# Patient Record
Sex: FEMALE
[De-identification: names, ages, dates, MRNs, and addresses within clinical notes are randomized per-mention and may not be internally consistent; named-entity substitution may affect disease eponyms.]

---

## 2022-09-23 ENCOUNTER — APPOINTMENT (OUTPATIENT)
Dept: AFTER HOURS CARE | Facility: EMERGENCY ROOM | Age: 62
End: 2022-09-23

## 2022-09-24 DIAGNOSIS — U07.1 COVID-19: ICD-10-CM

## 2022-09-24 PROCEDURE — 99204 OFFICE O/P NEW MOD 45 MIN: CPT | Mod: 95

## 2022-09-24 RX ORDER — NIRMATRELVIR AND RITONAVIR 300-100 MG
20 X 150 MG & KIT ORAL
Qty: 30 | Refills: 0 | Status: ACTIVE | COMMUNITY
Start: 2022-09-24 | End: 1900-01-01

## 2022-09-24 NOTE — HISTORY OF PRESENT ILLNESS
[Home] : at home, [unfilled] , at the time of the visit. [Other Location: e.g. Home (Enter Location, City,State)___] : at [unfilled] [Spouse] : spouse [Family Member] : family member [Verbal consent obtained from patient] : the patient, [unfilled] [FreeTextEntry8] : 61 yo f with hx of HTN, Hypothyroidism, depression vaccinated exposed to her  who had covid and now developed symptoms of fever sorethroat . current O2 sat on R A 92-94%\par \par \par \par \par

## 2022-09-24 NOTE — PHYSICAL EXAM
[No Acute Distress] : no acute distress [EOMI] : extraocular movements intact [Normal Outer Ear/Nose] : the outer ears and nose were normal in appearance [Supple] : supple [No Respiratory Distress] : no respiratory distress  [No Accessory Muscle Use] : no accessory muscle use [Normal Insight/Judgement] : insight and judgment were intact [de-identified] : moves upper ext [de-identified] : no rash on the face [de-identified] : alert and orinted with clear speech

## 2022-09-24 NOTE — REVIEW OF SYSTEMS
[Fever] : fever [Chills] : chills [Fatigue] : fatigue [Sore Throat] : sore throat [Discharge] : no discharge [Earache] : no earache [Nasal Discharge] : no nasal discharge [Chest Pain] : no chest pain [Palpitations] : no palpitations [Shortness Of Breath] : no shortness of breath [Cough] : no cough [Abdominal Pain] : no abdominal pain [Vomiting] : no vomiting [Dysuria] : no dysuria [Hematuria] : no hematuria [Joint Pain] : no joint pain [Muscle Pain] : no muscle pain [Itching] : no itching [Skin Rash] : no skin rash [Headache] : no headache [Dizziness] : no dizziness

## 2022-09-24 NOTE — PLAN
[With new medications prescribed] : Treat in place: with new medications prescribed [Primary Care/Internal Medicine/PMD] : Primary Care/Internal Medicine/PMD [FreeTextEntry1] : 63 yo f with hx of HTN, Hypothyroidism, depression with covid not in distress \par -- paxlovid \par -- supportive care

## 2024-12-24 ENCOUNTER — NON-APPOINTMENT (OUTPATIENT)
Age: 64
End: 2024-12-24